# Patient Record
Sex: MALE | Race: BLACK OR AFRICAN AMERICAN | Employment: FULL TIME | URBAN - METROPOLITAN AREA
[De-identification: names, ages, dates, MRNs, and addresses within clinical notes are randomized per-mention and may not be internally consistent; named-entity substitution may affect disease eponyms.]

---

## 2017-10-23 ENCOUNTER — HOSPITAL ENCOUNTER (EMERGENCY)
Age: 27
Discharge: HOME | End: 2017-10-23
Attending: EMERGENCY MEDICINE
Payer: COMMERCIAL

## 2017-10-23 VITALS
HEART RATE: 50 BPM | BODY MASS INDEX: 30.1 KG/M2 | OXYGEN SATURATION: 98 % | TEMPERATURE: 97.7 F | DIASTOLIC BLOOD PRESSURE: 58 MMHG | WEIGHT: 215 LBS | HEIGHT: 71 IN | SYSTOLIC BLOOD PRESSURE: 123 MMHG | RESPIRATION RATE: 16 BRPM

## 2017-10-23 DIAGNOSIS — A08.4 VIRAL GASTROENTERITIS: Primary | ICD-10-CM

## 2017-10-23 LAB
ABSOLUTE EOS #: 0.1 K/UL (ref 0–0.4)
ABSOLUTE IMMATURE GRANULOCYTE: NORMAL K/UL (ref 0–0.3)
ABSOLUTE LYMPH #: 1.4 K/UL (ref 1–4.8)
ABSOLUTE MONO #: 0.4 K/UL (ref 0.2–0.8)
ALBUMIN SERPL-MCNC: 4.4 G/DL (ref 3.5–5.2)
ALBUMIN/GLOBULIN RATIO: ABNORMAL (ref 1–2.5)
ALP BLD-CCNC: 80 U/L (ref 40–129)
ALT SERPL-CCNC: 13 U/L (ref 5–41)
ANION GAP SERPL CALCULATED.3IONS-SCNC: 12 MMOL/L (ref 9–17)
AST SERPL-CCNC: 17 U/L
BASOPHILS # BLD: 1 %
BASOPHILS ABSOLUTE: 0.1 K/UL (ref 0–0.2)
BILIRUB SERPL-MCNC: 0.31 MG/DL (ref 0.3–1.2)
BUN BLDV-MCNC: 14 MG/DL (ref 6–20)
BUN/CREAT BLD: 15 (ref 9–20)
CALCIUM SERPL-MCNC: 9.3 MG/DL (ref 8.6–10.4)
CHLORIDE BLD-SCNC: 107 MMOL/L (ref 98–107)
CO2: 27 MMOL/L (ref 20–31)
CREAT SERPL-MCNC: 0.96 MG/DL (ref 0.7–1.2)
DIFFERENTIAL TYPE: NORMAL
EOSINOPHILS RELATIVE PERCENT: 1 %
GFR AFRICAN AMERICAN: >60 ML/MIN
GFR NON-AFRICAN AMERICAN: >60 ML/MIN
GFR SERPL CREATININE-BSD FRML MDRD: ABNORMAL ML/MIN/{1.73_M2}
GFR SERPL CREATININE-BSD FRML MDRD: ABNORMAL ML/MIN/{1.73_M2}
GLUCOSE BLD-MCNC: 99 MG/DL (ref 70–99)
HCT VFR BLD CALC: 41.8 % (ref 41–53)
HEMOGLOBIN: 14.1 G/DL (ref 13.5–17.5)
IMMATURE GRANULOCYTES: NORMAL %
LIPASE: 13 U/L (ref 13–60)
LYMPHOCYTES # BLD: 15 %
MCH RBC QN AUTO: 30.5 PG (ref 26–34)
MCHC RBC AUTO-ENTMCNC: 33.8 G/DL (ref 31–37)
MCV RBC AUTO: 90.1 FL (ref 80–100)
MONOCYTES # BLD: 4 %
PDW BLD-RTO: 14.3 % (ref 11.5–14.5)
PLATELET # BLD: 173 K/UL (ref 130–400)
PLATELET ESTIMATE: NORMAL
PMV BLD AUTO: 9.4 FL (ref 6–12)
POTASSIUM SERPL-SCNC: 4 MMOL/L (ref 3.7–5.3)
RBC # BLD: 4.64 M/UL (ref 4.5–5.9)
RBC # BLD: NORMAL 10*6/UL
SEG NEUTROPHILS: 79 %
SEGMENTED NEUTROPHILS ABSOLUTE COUNT: 7.5 K/UL (ref 1.8–7.7)
SODIUM BLD-SCNC: 146 MMOL/L (ref 135–144)
TOTAL PROTEIN: 7.1 G/DL (ref 6.4–8.3)
WBC # BLD: 9.5 K/UL (ref 3.5–11)
WBC # BLD: NORMAL 10*3/UL

## 2017-10-23 PROCEDURE — 6360000002 HC RX W HCPCS: Performed by: NURSE PRACTITIONER

## 2017-10-23 PROCEDURE — 83690 ASSAY OF LIPASE: CPT

## 2017-10-23 PROCEDURE — 96374 THER/PROPH/DIAG INJ IV PUSH: CPT

## 2017-10-23 PROCEDURE — 99284 EMERGENCY DEPT VISIT MOD MDM: CPT

## 2017-10-23 PROCEDURE — 2580000003 HC RX 258: Performed by: NURSE PRACTITIONER

## 2017-10-23 PROCEDURE — 80053 COMPREHEN METABOLIC PANEL: CPT

## 2017-10-23 PROCEDURE — 85025 COMPLETE CBC W/AUTO DIFF WBC: CPT

## 2017-10-23 PROCEDURE — 96361 HYDRATE IV INFUSION ADD-ON: CPT

## 2017-10-23 PROCEDURE — 96375 TX/PRO/DX INJ NEW DRUG ADDON: CPT

## 2017-10-23 RX ORDER — 0.9 % SODIUM CHLORIDE 0.9 %
1000 INTRAVENOUS SOLUTION INTRAVENOUS ONCE
Status: COMPLETED | OUTPATIENT
Start: 2017-10-23 | End: 2017-10-23

## 2017-10-23 RX ORDER — FENTANYL CITRATE 50 UG/ML
50 INJECTION, SOLUTION INTRAMUSCULAR; INTRAVENOUS ONCE
Status: COMPLETED | OUTPATIENT
Start: 2017-10-23 | End: 2017-10-23

## 2017-10-23 RX ORDER — ONDANSETRON 2 MG/ML
4 INJECTION INTRAMUSCULAR; INTRAVENOUS ONCE
Status: COMPLETED | OUTPATIENT
Start: 2017-10-23 | End: 2017-10-23

## 2017-10-23 RX ORDER — ONDANSETRON 4 MG/1
4 TABLET, ORALLY DISINTEGRATING ORAL EVERY 6 HOURS PRN
Qty: 12 TABLET | Refills: 0 | Status: SHIPPED | OUTPATIENT
Start: 2017-10-23 | End: 2018-09-19 | Stop reason: ALTCHOICE

## 2017-10-23 RX ADMIN — SODIUM CHLORIDE 1000 ML: 9 INJECTION, SOLUTION INTRAVENOUS at 18:57

## 2017-10-23 RX ADMIN — FENTANYL CITRATE 50 MCG: 50 INJECTION INTRAMUSCULAR; INTRAVENOUS at 19:03

## 2017-10-23 RX ADMIN — ONDANSETRON 4 MG: 2 INJECTION INTRAMUSCULAR; INTRAVENOUS at 19:01

## 2017-10-23 ASSESSMENT — ENCOUNTER SYMPTOMS
VOMITING: 1
NAUSEA: 1
ABDOMINAL PAIN: 0
DIARRHEA: 1

## 2017-10-23 ASSESSMENT — PAIN SCALES - GENERAL
PAINLEVEL_OUTOF10: 10
PAINLEVEL_OUTOF10: 5

## 2017-10-23 ASSESSMENT — PAIN DESCRIPTION - LOCATION: LOCATION: HEAD

## 2017-10-23 ASSESSMENT — PAIN DESCRIPTION - DESCRIPTORS: DESCRIPTORS: ACHING;DISCOMFORT

## 2017-10-23 ASSESSMENT — PAIN DESCRIPTION - PROGRESSION: CLINICAL_PROGRESSION: GRADUALLY WORSENING

## 2017-10-23 NOTE — ED NOTES
Pt ambulatory to room 14 with c/o frontal lobe HA with N/V and intermittent SOB, onset approximately 1330 today. Pt reports hx of Migraine H/A, states \"I used to take Topamax when I was a kid, but I don't take it anymore\". Pt reports slight blurred vision, but denies any diplopia. Pt denies any CP, dizziness, abd pain, diarrhea, urinary symptoms, or fevers.       Jorge Robertson RN  10/23/17 1910

## 2017-10-23 NOTE — ED PROVIDER NOTES
The patient was seen and examined by me in conjunction with the mid-level provider. I agree with his/her assessment and treatment plan. My clinical impression is that the patient has viral gastroenteritis.      Federico Galan MD  10/23/17 3074
nerve deficit or sensory deficit. Skin: Skin is warm, dry and intact. Psychiatric: He has a normal mood and affect. His speech is normal and behavior is normal. Judgment and thought content normal. Cognition and memory are normal.         DIAGNOSTIC RESULTS     EKG: All EKG's are interpreted by the Emergency Department Physician who either signs or Co-signs this chart in the absence of a cardiologist.        RADIOLOGY:   Non-plain film images such as CT, Ultrasound and MRI are read by the radiologist. Plain radiographic images are visualized and preliminarily interpreted by the emergency physician with the below findings:        Interpretation per the Radiologist below, if available at the time of this note:    No orders to display         ED BEDSIDE ULTRASOUND:   Performed by ED Physician - none    LABS:  Labs Reviewed   COMPREHENSIVE METABOLIC PANEL - Abnormal; Notable for the following:        Result Value    Sodium 146 (*)     All other components within normal limits   CBC WITH AUTO DIFFERENTIAL   LIPASE       All other labs were within normal range or not returned as of this dictation. EMERGENCY DEPARTMENT COURSE and DIFFERENTIAL DIAGNOSIS/MDM:   Patient was evaluated in conjunction with Dr. Jacy Granados. Labs reviewed. Abdomen is soft and nontender. Bowel sounds active. No neurological deficits. Patient was given IV fluids, fentanyl and Zofran in the ED. Patient states his symptoms have improved. He'll be discharged home with Zofran for symptom management. Patient likely has a gastroenteritis. Return to ED if symptoms worsen. Vitals:    Vitals:    10/23/17 1840   BP: (!) 123/58   Pulse: 50   Resp: 16   Temp: 97.7 °F (36.5 °C)   TempSrc: Oral   SpO2: 98%   Weight: 215 lb (97.5 kg)   Height: 5' 11\" (1.803 m)         CONSULTS:  None    PROCEDURES:  Procedures    FINAL IMPRESSION      1.  Viral gastroenteritis          DISPOSITION/PLAN   DISPOSITION Decision to Discharge    PATIENT REFERRED TO:

## 2017-12-29 ENCOUNTER — HOSPITAL ENCOUNTER (EMERGENCY)
Age: 27
Discharge: HOME | End: 2017-12-30
Attending: EMERGENCY MEDICINE
Payer: COMMERCIAL

## 2017-12-29 DIAGNOSIS — R21 PENILE RASH: ICD-10-CM

## 2017-12-29 DIAGNOSIS — A60.00 GENITAL HERPES SIMPLEX, UNSPECIFIED SITE: Primary | ICD-10-CM

## 2017-12-29 PROCEDURE — 99283 EMERGENCY DEPT VISIT LOW MDM: CPT

## 2017-12-29 NOTE — LETTER
[facility] ED  [address] [address]  Phone: [phone]        January 18, 2018      Dear Amaury Soni    At the time of your visit to [address] [place] [address] Emergency Room on , A culture was obtained. It was positive for : gonorrhea and chlamydia. You were treated at [address] sex partner needs to be treated also. You received treatment at the time of your visit. Follow up with your primary care physician is important for your health maintenance. Please make an appointment as soon as possible.        Sincerely,         [address] [place] [address] ER

## 2017-12-30 ENCOUNTER — HOSPITAL ENCOUNTER (EMERGENCY)
Age: 27
Discharge: HOME | End: 2017-12-30
Payer: COMMERCIAL

## 2017-12-30 VITALS
OXYGEN SATURATION: 98 % | RESPIRATION RATE: 15 BRPM | SYSTOLIC BLOOD PRESSURE: 120 MMHG | HEIGHT: 71 IN | WEIGHT: 210 LBS | DIASTOLIC BLOOD PRESSURE: 72 MMHG | HEART RATE: 70 BPM | BODY MASS INDEX: 29.4 KG/M2 | TEMPERATURE: 97.9 F

## 2017-12-30 VITALS
SYSTOLIC BLOOD PRESSURE: 157 MMHG | OXYGEN SATURATION: 100 % | HEIGHT: 71 IN | TEMPERATURE: 98.5 F | RESPIRATION RATE: 16 BRPM | WEIGHT: 210 LBS | BODY MASS INDEX: 29.4 KG/M2 | HEART RATE: 56 BPM | DIASTOLIC BLOOD PRESSURE: 84 MMHG

## 2017-12-30 DIAGNOSIS — N48.5 PENILE ULCER: ICD-10-CM

## 2017-12-30 DIAGNOSIS — N34.2 URETHRITIS: Primary | ICD-10-CM

## 2017-12-30 LAB
DIRECT EXAM: NORMAL
DIRECT EXAM: NORMAL
Lab: NORMAL
SPECIMEN DESCRIPTION: NORMAL
STATUS: NORMAL
T. PALLIDUM, IGG: NONREACTIVE

## 2017-12-30 PROCEDURE — 99283 EMERGENCY DEPT VISIT LOW MDM: CPT

## 2017-12-30 PROCEDURE — 87015 SPECIMEN INFECT AGNT CONCNTJ: CPT

## 2017-12-30 PROCEDURE — 86780 TREPONEMA PALLIDUM: CPT

## 2017-12-30 PROCEDURE — 87255 GENET VIRUS ISOLATE HSV: CPT

## 2017-12-30 PROCEDURE — 6360000002 HC RX W HCPCS: Performed by: NURSE PRACTITIONER

## 2017-12-30 PROCEDURE — 87210 SMEAR WET MOUNT SALINE/INK: CPT

## 2017-12-30 PROCEDURE — 87491 CHLMYD TRACH DNA AMP PROBE: CPT

## 2017-12-30 PROCEDURE — 87591 N.GONORRHOEAE DNA AMP PROB: CPT

## 2017-12-30 PROCEDURE — 96372 THER/PROPH/DIAG INJ SC/IM: CPT

## 2017-12-30 PROCEDURE — 6370000000 HC RX 637 (ALT 250 FOR IP): Performed by: NURSE PRACTITIONER

## 2017-12-30 RX ORDER — CEFTRIAXONE SODIUM 250 MG/1
250 INJECTION, POWDER, FOR SOLUTION INTRAMUSCULAR; INTRAVENOUS ONCE
Status: COMPLETED | OUTPATIENT
Start: 2017-12-30 | End: 2017-12-30

## 2017-12-30 RX ORDER — IPRATROPIUM BROMIDE AND ALBUTEROL SULFATE 2.5; .5 MG/3ML; MG/3ML
1 SOLUTION RESPIRATORY (INHALATION)
Status: DISCONTINUED | OUTPATIENT
Start: 2017-12-30 | End: 2017-12-30 | Stop reason: HOSPADM

## 2017-12-30 RX ORDER — AZITHROMYCIN 250 MG/1
1000 TABLET, FILM COATED ORAL ONCE
Status: COMPLETED | OUTPATIENT
Start: 2017-12-30 | End: 2017-12-30

## 2017-12-30 RX ORDER — ACYCLOVIR 800 MG/1
800 TABLET ORAL
Qty: 50 TABLET | Refills: 0 | Status: SHIPPED | OUTPATIENT
Start: 2017-12-30 | End: 2017-12-30

## 2017-12-30 RX ORDER — ACYCLOVIR 800 MG/1
200 TABLET ORAL
Qty: 25 TABLET | Refills: 0 | Status: SHIPPED | OUTPATIENT
Start: 2017-12-30 | End: 2018-01-09

## 2017-12-30 RX ADMIN — CEFTRIAXONE SODIUM 250 MG: 250 INJECTION, POWDER, FOR SOLUTION INTRAMUSCULAR; INTRAVENOUS at 14:56

## 2017-12-30 RX ADMIN — AZITHROMYCIN 1000 MG: 250 TABLET, FILM COATED ORAL at 14:55

## 2017-12-30 ASSESSMENT — PAIN DESCRIPTION - DURATION: DURATION_2: CONTINUOUS

## 2017-12-30 ASSESSMENT — PAIN DESCRIPTION - ORIENTATION
ORIENTATION_2: POSTERIOR
ORIENTATION: DISTAL

## 2017-12-30 ASSESSMENT — PAIN DESCRIPTION - PAIN TYPE
TYPE_2: ACUTE PAIN
TYPE: ACUTE PAIN

## 2017-12-30 ASSESSMENT — PAIN DESCRIPTION - FREQUENCY: FREQUENCY: CONTINUOUS

## 2017-12-30 ASSESSMENT — PAIN DESCRIPTION - LOCATION
LOCATION: PENIS
LOCATION_2: HEAD

## 2017-12-30 ASSESSMENT — PAIN DESCRIPTION - DESCRIPTORS: DESCRIPTORS_2: SORE

## 2017-12-30 ASSESSMENT — PAIN DESCRIPTION - INTENSITY: RATING_2: 10

## 2017-12-30 ASSESSMENT — PAIN SCALES - GENERAL: PAINLEVEL_OUTOF10: 10

## 2017-12-30 NOTE — ED PROVIDER NOTES
EYES: Eyes are normal to inspection, Pupils equal, round and reactive to light. NECK: Normal ROM, No jugular venous distention, No meningeal signs, Cervical spine nontender. RESPIRATORY CHEST: No respiratory distress. Symmetric chest rise observed. Lungs are clear to auscultate without wheezes, rhonchi or rhales     ABDOMEN: Abdomen is nontender, No distension. :  No cva tenderness   UPPER EXTREMITY: Inspection normal, No cyanosis. NEURO: GCS is 15, Speech normal, Memory normal.   SKIN: Skin is warm, Skin is dry. PSYCHIATRIC: Oriented X 3, Normal affect  Testicular exam chaperoned. Three lesions. One on the left aspect near the head of the penis ulcerated slightly tender. The other two on the right side ulcerated. Tender. Without any crusting or yellowness. Slight lad bilaterally. DIFFERENTIAL  DIAGNOSIS     PLAN (LABS / IMAGING / EKG):  Orders Placed This Encounter   Procedures    Wet prep, genital    C.trachomatis N.gonorrhoeae DNA, Urine    Herpes Simplex Virus Culture    T. PALLIDUM AB    Inpatient consult to Social Work       MEDICATIONS ORDERED:  Orders Placed This Encounter   Medications    DISCONTD: ipratropium-albuterol (DUONEB) nebulizer solution 1 ampule    DISCONTD: acyclovir (ZOVIRAX) 800 MG tablet     Sig: Take 1 tablet by mouth 5 times daily for 10 days     Dispense:  50 tablet     Refill:  0    acyclovir (ZOVIRAX) 800 MG tablet     Sig: Take 0.5 tablets by mouth 5 times daily for 10 days     Dispense:  25 tablet     Refill:  0       DDX: ureteritis via std (gonnococcal, chlamydia) others trichomonas, herpes, other causes of urethritis include chemical/irritants, allergic, physical damage, adenovirus, other infections like mycoplasm    DIAGNOSTIC RESULTS / EMERGENCY DEPARTMENT COURSE / MDM     LABS:  Results for orders placed or performed during the hospital encounter of 12/29/17   Wet prep, genital   Result Value Ref Range    Specimen Description . URINE     Special Requests

## 2017-12-30 NOTE — ED PROVIDER NOTES
[address] [place] [address] ED  eMERGENCY dEPARTMENT eNCOUnter      Pt Name: Radha Schaffer  MRN: [id number]  [place] 1990  Date of evaluation: 12/30/2017  Provider: Linda Umana NP    [address] [place] [address]       Chief Complaint   Patient presents with    Dysuria    Penile Discharge    Other     open sore R side of penis         HISTORY OF PRESENT ILLNESS  (Location/Symptom, Timing/Onset, Context/Setting, Quality, Duration, Modifying Factors, Severity.)   Radha Schaffer is a 24 y.o. male who presents to the emergency department Via private auto with a little sores and penile drainage. He noticed sores to the penis within the past 4-5 days. He was seen at another emergency room last evening and was told that the sores are likely herpes. They did blood work and took a urine specimen but he states the lesions were not swabbed. He would like a second opinion. In addition to the rash that started earlier this week he developed white penile drainage that started this morning. He does admit to unprotected sex. He denies abdominal pain, back pain or fevers. Nursing Notes were reviewed. ALLERGIES     Pcn [penicillins];  Seasonal; and Vicodin [hydrocodone-acetaminophen]    CURRENT MEDICATIONS       Discharge Medication List as of 12/30/2017  3:14 PM      CONTINUE these medications which have NOT CHANGED    Details   acyclovir (ZOVIRAX) 800 MG tablet Take 0.5 tablets by mouth 5 times daily for 10 days, Disp-25 tablet, R-0Print      QUEtiapine Fumarate (SEROQUEL PO) Take 300 mg by mouthHistorical Med      Divalproex Sodium (DEPAKOTE PO) Take by mouthHistorical Med      ondansetron (ZOFRAN ODT) 4 MG disintegrating tablet Take 1 tablet by mouth every 6 hours as needed for Nausea or Vomiting, Disp-12 tablet, R-0Print      ibuprofen (ADVIL;MOTRIN) 800 MG tablet Take 1 tablet by mouth every 8 hours as needed for Pain, Disp-30 tablet, R-0      albuterol (PROVENTIL HFA) 108 (90 BASE) MCG/ACT inhaler Inhale 1-2 puffs into the lungs

## 2017-12-30 NOTE — ED NOTES
Pt reported to Ed with c/o of generalized body aches and slight joint pains, stating \"I am feeling achy. \" Pt also c/o of rash/sores on penis, that are painful, and \"feels like a needle,\" upon touch. Pt denies unprotected sex with anyone, and states \"besides my wife, but it has been a few weeks since the last time. \"     Pt also requests a breathing treatment and states that it \"feels like fluid\" is in his chest.       Danielito Norton Sindy  12/30/17 0012       Danielito Norton Sindy  12/30/17 0022

## 2018-01-01 LAB
CULTURE: ABNORMAL
CULTURE: NORMAL
CULTURE: NORMAL
Lab: ABNORMAL
Lab: NORMAL
SPECIMEN DESCRIPTION: ABNORMAL
SPECIMEN DESCRIPTION: ABNORMAL
SPECIMEN DESCRIPTION: NORMAL
STATUS: ABNORMAL
STATUS: NORMAL

## 2018-01-02 LAB
C. TRACHOMATIS DNA ,URINE: ABNORMAL
N. GONORRHOEAE DNA, URINE: ABNORMAL

## 2018-02-05 ENCOUNTER — APPOINTMENT (OUTPATIENT)
Dept: GENERAL RADIOLOGY | Age: 28
End: 2018-02-05
Payer: COMMERCIAL

## 2018-02-05 ENCOUNTER — APPOINTMENT (OUTPATIENT)
Dept: CT IMAGING | Age: 28
End: 2018-02-05
Payer: COMMERCIAL

## 2018-02-05 ENCOUNTER — HOSPITAL ENCOUNTER (EMERGENCY)
Age: 28
Discharge: HOME | End: 2018-02-05
Attending: EMERGENCY MEDICINE
Payer: COMMERCIAL

## 2018-02-05 VITALS
DIASTOLIC BLOOD PRESSURE: 50 MMHG | OXYGEN SATURATION: 100 % | TEMPERATURE: 97.8 F | HEIGHT: 71 IN | BODY MASS INDEX: 30.8 KG/M2 | SYSTOLIC BLOOD PRESSURE: 103 MMHG | RESPIRATION RATE: 14 BRPM | WEIGHT: 220 LBS | HEART RATE: 55 BPM

## 2018-02-05 DIAGNOSIS — J01.90 ACUTE SINUSITIS, RECURRENCE NOT SPECIFIED, UNSPECIFIED LOCATION: ICD-10-CM

## 2018-02-05 DIAGNOSIS — S20.211A CONTUSION OF RIGHT CHEST WALL, INITIAL ENCOUNTER: ICD-10-CM

## 2018-02-05 DIAGNOSIS — F10.929 ACUTE ALCOHOLIC INTOXICATION WITH COMPLICATION (HCC): ICD-10-CM
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
## 2018-02-05 DIAGNOSIS — S09.90XA CLOSED HEAD INJURY, INITIAL ENCOUNTER: ICD-10-CM

## 2018-02-05 DIAGNOSIS — S80.11XA CONTUSION OF MULTIPLE SITES OF RIGHT LOWER EXTREMITY, INITIAL ENCOUNTER: ICD-10-CM

## 2018-02-05 LAB
ABSOLUTE EOS #: 0.2 K/UL (ref 0–0.4)
ABSOLUTE IMMATURE GRANULOCYTE: NORMAL K/UL (ref 0–0.3)
ABSOLUTE LYMPH #: 2.5 K/UL (ref 1–4.8)
ABSOLUTE MONO #: 0.6 K/UL (ref 0.1–1.3)
ALBUMIN SERPL-MCNC: 4.1 G/DL (ref 3.5–5.2)
ALBUMIN/GLOBULIN RATIO: ABNORMAL (ref 1–2.5)
ALP BLD-CCNC: 85 U/L (ref 40–129)
ALT SERPL-CCNC: 20 U/L (ref 5–41)
ANION GAP SERPL CALCULATED.3IONS-SCNC: 18 MMOL/L (ref 9–17)
AST SERPL-CCNC: 26 U/L
BASOPHILS # BLD: 1 % (ref 0–2)
BASOPHILS ABSOLUTE: 0 K/UL (ref 0–0.2)
BILIRUB SERPL-MCNC: <0.15 MG/DL (ref 0.3–1.2)
BILIRUBIN DIRECT: <0.08 MG/DL
BILIRUBIN, INDIRECT: ABNORMAL MG/DL (ref 0–1)
BUN BLDV-MCNC: 14 MG/DL (ref 6–20)
BUN/CREAT BLD: ABNORMAL (ref 9–20)
CALCIUM SERPL-MCNC: 8.9 MG/DL (ref 8.6–10.4)
CHLORIDE BLD-SCNC: 102 MMOL/L (ref 98–107)
CO2: 20 MMOL/L (ref 20–31)
CREAT SERPL-MCNC: 0.78 MG/DL (ref 0.7–1.2)
DIFFERENTIAL TYPE: NORMAL
EOSINOPHILS RELATIVE PERCENT: 2 % (ref 0–4)
ETHANOL PERCENT: 0.13 %
ETHANOL: 134 MG/DL
GFR AFRICAN AMERICAN: >60 ML/MIN
GFR NON-AFRICAN AMERICAN: >60 ML/MIN
GFR SERPL CREATININE-BSD FRML MDRD: ABNORMAL ML/MIN/{1.73_M2}
GFR SERPL CREATININE-BSD FRML MDRD: ABNORMAL ML/MIN/{1.73_M2}
GLOBULIN: ABNORMAL G/DL (ref 1.5–3.8)
GLUCOSE BLD-MCNC: 85 MG/DL (ref 70–99)
HCT VFR BLD CALC: 44.1 % (ref 41–53)
HEMOGLOBIN: 15.2 G/DL (ref 13.5–17.5)
IMMATURE GRANULOCYTES: NORMAL %
INR BLD: 1
LACTIC ACID: 2.5 MMOL/L (ref 0.5–2.2)
LYMPHOCYTES # BLD: 30 % (ref 24–44)
MCH RBC QN AUTO: 31.4 PG (ref 26–34)
MCHC RBC AUTO-ENTMCNC: 34.5 G/DL (ref 31–37)
MCV RBC AUTO: 90.8 FL (ref 80–100)
MONOCYTES # BLD: 7 % (ref 1–7)
NRBC AUTOMATED: NORMAL PER 100 WBC
PDW BLD-RTO: 13.5 % (ref 11.5–14.9)
PLATELET # BLD: 161 K/UL (ref 150–450)
PLATELET ESTIMATE: NORMAL
PMV BLD AUTO: 10.2 FL (ref 6–12)
POTASSIUM SERPL-SCNC: 3.3 MMOL/L (ref 3.7–5.3)
PROTHROMBIN TIME: 10.1 SEC (ref 9.7–12)
RBC # BLD: 4.85 M/UL (ref 4.5–5.9)
RBC # BLD: NORMAL 10*6/UL
SEG NEUTROPHILS: 60 % (ref 36–66)
SEGMENTED NEUTROPHILS ABSOLUTE COUNT: 4.9 K/UL (ref 1.3–9.1)
SODIUM BLD-SCNC: 140 MMOL/L (ref 135–144)
TOTAL PROTEIN: 6.9 G/DL (ref 6.4–8.3)
TROPONIN INTERP: NORMAL
TROPONIN T: <0.03 NG/ML
WBC # BLD: 8.2 K/UL (ref 3.5–11)
WBC # BLD: NORMAL 10*3/UL

## 2018-02-05 PROCEDURE — 70450 CT HEAD/BRAIN W/O DYE: CPT

## 2018-02-05 PROCEDURE — 85610 PROTHROMBIN TIME: CPT

## 2018-02-05 PROCEDURE — 99284 EMERGENCY DEPT VISIT MOD MDM: CPT

## 2018-02-05 PROCEDURE — 74177 CT ABD & PELVIS W/CONTRAST: CPT

## 2018-02-05 PROCEDURE — 83605 ASSAY OF LACTIC ACID: CPT

## 2018-02-05 PROCEDURE — 71260 CT THORAX DX C+: CPT

## 2018-02-05 PROCEDURE — 73590 X-RAY EXAM OF LOWER LEG: CPT

## 2018-02-05 PROCEDURE — 80076 HEPATIC FUNCTION PANEL: CPT

## 2018-02-05 PROCEDURE — 2580000003 HC RX 258: Performed by: EMERGENCY MEDICINE

## 2018-02-05 PROCEDURE — 73552 X-RAY EXAM OF FEMUR 2/>: CPT

## 2018-02-05 PROCEDURE — 84484 ASSAY OF TROPONIN QUANT: CPT

## 2018-02-05 PROCEDURE — 85025 COMPLETE CBC W/AUTO DIFF WBC: CPT

## 2018-02-05 PROCEDURE — 72125 CT NECK SPINE W/O DYE: CPT

## 2018-02-05 PROCEDURE — 6360000004 HC RX CONTRAST MEDICATION: Performed by: EMERGENCY MEDICINE

## 2018-02-05 PROCEDURE — 80048 BASIC METABOLIC PNL TOTAL CA: CPT

## 2018-02-05 PROCEDURE — G0480 DRUG TEST DEF 1-7 CLASSES: HCPCS

## 2018-02-05 PROCEDURE — 36415 COLL VENOUS BLD VENIPUNCTURE: CPT

## 2018-02-05 RX ORDER — CYCLOBENZAPRINE HCL 10 MG
10 TABLET ORAL 3 TIMES DAILY PRN
Qty: 15 TABLET | Refills: 0 | Status: SHIPPED | OUTPATIENT
Start: 2018-02-05 | End: 2018-02-15

## 2018-02-05 RX ORDER — 0.9 % SODIUM CHLORIDE 0.9 %
1000 INTRAVENOUS SOLUTION INTRAVENOUS ONCE
Status: COMPLETED | OUTPATIENT
Start: 2018-02-05 | End: 2018-02-05

## 2018-02-05 RX ORDER — SODIUM CHLORIDE 0.9 % (FLUSH) 0.9 %
10 SYRINGE (ML) INJECTION PRN
Status: DISCONTINUED | OUTPATIENT
Start: 2018-02-05 | End: 2018-02-05 | Stop reason: HOSPADM

## 2018-02-05 RX ORDER — IBUPROFEN 600 MG/1
600 TABLET ORAL EVERY 6 HOURS PRN
Qty: 30 TABLET | Refills: 0 | Status: SHIPPED | OUTPATIENT
Start: 2018-02-05 | End: 2018-09-19 | Stop reason: ALTCHOICE

## 2018-02-05 RX ORDER — 0.9 % SODIUM CHLORIDE 0.9 %
100 INTRAVENOUS SOLUTION INTRAVENOUS ONCE
Status: COMPLETED | OUTPATIENT
Start: 2018-02-05 | End: 2018-02-05

## 2018-02-05 RX ADMIN — SODIUM CHLORIDE 1000 ML: 9 INJECTION, SOLUTION INTRAVENOUS at 04:45

## 2018-02-05 RX ADMIN — IOPAMIDOL 100 ML: 755 INJECTION, SOLUTION INTRAVENOUS at 05:08

## 2018-02-05 RX ADMIN — SODIUM CHLORIDE 100 ML: 9 INJECTION, SOLUTION INTRAVENOUS at 05:08

## 2018-02-05 RX ADMIN — Medication 10 ML: at 05:08

## 2018-02-05 ASSESSMENT — PAIN DESCRIPTION - PAIN TYPE: TYPE: ACUTE PAIN

## 2018-02-05 ASSESSMENT — ENCOUNTER SYMPTOMS
NAUSEA: 0
ABDOMINAL PAIN: 1
BACK PAIN: 1
VOMITING: 0
SHORTNESS OF BREATH: 0
COUGH: 0

## 2018-02-05 ASSESSMENT — PAIN SCALES - GENERAL: PAINLEVEL_OUTOF10: 10

## 2018-02-05 ASSESSMENT — PAIN DESCRIPTION - ORIENTATION: ORIENTATION: RIGHT

## 2018-02-05 ASSESSMENT — PAIN DESCRIPTION - LOCATION: LOCATION: GENERALIZED

## 2018-02-05 ASSESSMENT — PAIN DESCRIPTION - DESCRIPTORS: DESCRIPTORS: ACHING

## 2018-02-05 ASSESSMENT — PAIN DESCRIPTION - FREQUENCY: FREQUENCY: CONTINUOUS
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
## 2018-09-19 ENCOUNTER — OFFICE VISIT (OUTPATIENT)
Dept: FAMILY MEDICINE CLINIC | Age: 28
End: 2018-09-19
Payer: COMMERCIAL

## 2018-09-19 VITALS
RESPIRATION RATE: 16 BRPM | WEIGHT: 201 LBS | BODY MASS INDEX: 28.03 KG/M2 | OXYGEN SATURATION: 98 % | DIASTOLIC BLOOD PRESSURE: 78 MMHG | SYSTOLIC BLOOD PRESSURE: 136 MMHG | HEART RATE: 60 BPM

## 2018-09-19 DIAGNOSIS — Z00.00 VISIT FOR WELL MAN HEALTH CHECK: Primary | ICD-10-CM

## 2018-09-19 DIAGNOSIS — K59.00 CONSTIPATION, UNSPECIFIED CONSTIPATION TYPE: ICD-10-CM

## 2018-09-19 DIAGNOSIS — K21.9 GASTROESOPHAGEAL REFLUX DISEASE WITHOUT ESOPHAGITIS: ICD-10-CM

## 2018-09-19 DIAGNOSIS — F32.A ANXIETY AND DEPRESSION: ICD-10-CM

## 2018-09-19 DIAGNOSIS — Z76.89 ESTABLISHING CARE WITH NEW DOCTOR, ENCOUNTER FOR: ICD-10-CM

## 2018-09-19 DIAGNOSIS — J45.20 MILD INTERMITTENT ASTHMA WITHOUT COMPLICATION: ICD-10-CM

## 2018-09-19 DIAGNOSIS — A60.00 GENITAL HERPES SIMPLEX, UNSPECIFIED SITE: ICD-10-CM

## 2018-09-19 DIAGNOSIS — F41.9 ANXIETY AND DEPRESSION: ICD-10-CM

## 2018-09-19 DIAGNOSIS — F17.200 SMOKES: ICD-10-CM

## 2018-09-19 PROCEDURE — 99385 PREV VISIT NEW AGE 18-39: CPT | Performed by: FAMILY MEDICINE

## 2018-09-19 PROCEDURE — 99406 BEHAV CHNG SMOKING 3-10 MIN: CPT | Performed by: FAMILY MEDICINE

## 2018-09-19 PROCEDURE — 99203 OFFICE O/P NEW LOW 30 MIN: CPT | Performed by: FAMILY MEDICINE

## 2018-09-19 RX ORDER — ALBUTEROL SULFATE 90 UG/1
1-2 AEROSOL, METERED RESPIRATORY (INHALATION) EVERY 4 HOURS PRN
Qty: 1 INHALER | Refills: 0 | Status: SHIPPED | OUTPATIENT
Start: 2018-09-19

## 2018-09-19 RX ORDER — OMEPRAZOLE 40 MG/1
40 CAPSULE, DELAYED RELEASE ORAL DAILY
Qty: 30 CAPSULE | Refills: 3 | Status: SHIPPED | OUTPATIENT
Start: 2018-09-19

## 2018-09-19 RX ORDER — VARENICLINE TARTRATE 1 MG/1
1 TABLET, FILM COATED ORAL 2 TIMES DAILY
Qty: 60 TABLET | Refills: 1 | Status: SHIPPED | OUTPATIENT
Start: 2018-09-19 | End: 2018-11-07

## 2018-09-19 RX ORDER — POLYETHYLENE GLYCOL 3350 17 G/17G
17 POWDER, FOR SOLUTION ORAL DAILY
Qty: 510 G | Refills: 3 | Status: SHIPPED | OUTPATIENT
Start: 2018-09-19 | End: 2018-10-19

## 2018-09-19 RX ORDER — ONDANSETRON HYDROCHLORIDE 8 MG/1
8 TABLET, FILM COATED ORAL EVERY 8 HOURS PRN
Qty: 30 TABLET | Refills: 0 | Status: SHIPPED | OUTPATIENT
Start: 2018-09-19 | End: 2018-10-04 | Stop reason: SDUPTHER

## 2018-09-19 RX ORDER — VARENICLINE TARTRATE 25 MG
KIT ORAL
Qty: 1 EACH | Refills: 0 | Status: SHIPPED | OUTPATIENT
Start: 2018-09-19 | End: 2018-11-07

## 2018-09-19 ASSESSMENT — PATIENT HEALTH QUESTIONNAIRE - PHQ9
1. LITTLE INTEREST OR PLEASURE IN DOING THINGS: 0
SUM OF ALL RESPONSES TO PHQ QUESTIONS 1-9: 1
SUM OF ALL RESPONSES TO PHQ QUESTIONS 1-9: 1
2. FEELING DOWN, DEPRESSED OR HOPELESS: 1
SUM OF ALL RESPONSES TO PHQ9 QUESTIONS 1 & 2: 1

## 2018-09-19 ASSESSMENT — ENCOUNTER SYMPTOMS
EYE PAIN: 0
SHORTNESS OF BREATH: 0
NAUSEA: 1
DIARRHEA: 1
CONSTIPATION: 1

## 2018-09-19 NOTE — PROGRESS NOTES
Inhaler; Refill: 0    5. Anxiety and depression  Had a long discussion about importance of exercise and self-care. Recommended the pt meet with behavioral health for counseling. Rx given for antidepressant. Discussed side effects including suicidal thoughts, weight gain, sexual dysfunction, N/V, muscle cramps, headaches. Suicide contracted with the pt. Pt agrees to call 911 or seek medical care urgently if they feel suicidal. Follow up in 3-4 weeks for reassessment.  Olman Number E. Krista Foster, PhD - Nicholas Madison, PhD  - sertraline (ZOLOFT) 50 MG tablet; Take 1 tablet by mouth daily  Dispense: 30 tablet; Refill: 0    6. Gastroesophageal reflux disease without esophagitis  Pt seems to have signs and symptoms consistent with GERD. Started 8 week course of a PPI, pt will then switch to taking it as needed. Possible side effects of PPI therapy were discussed with the pt including calcium, magnesium, B12, iron malabsorption as well as kidney disease and C. difficile. Dietary and lifestyle advice was also given. Recommended avoidance of caffeine, nicotine, spicy food, greasy food, and pop. Pt was advised to place two small blocks under the head end of the bed which may help with night time reflux. Was advised not to eat at least 2-3 hrs before going to bed.   - ondansetron (ZOFRAN) 8 MG tablet; Take 1 tablet by mouth every 8 hours as needed for Nausea or Vomiting  Dispense: 30 tablet; Refill: 0  - omeprazole (PRILOSEC) 40 MG delayed release capsule; Take 1 capsule by mouth daily  Dispense: 30 capsule;  Refill: 3    Constipation  Recommend increasing water intake  Recommend high fiber diet  Recommend avoiding constipating foods - eg. cheese and bananas  Advised to use MiraLAX 1/2-1 capful 1-2 times per day and to mix with at least 8 ounces of water per 1 capful  Recommend daily exercise  Monitor for worsening symptoms    Flu shot declined  Follow-up in one month    Call or return to clinic prn if these symptoms worsen or fail to improve as anticipated. I have reviewed the instructions with the patient, answering all questions to their satisfaction.     Electronically signed by Jonathan Trujillo MD on 9/19/2018 at 4:18 PM

## 2018-09-24 LAB
ANTIBODY: NORMAL
ANTIGEN INTERPRETATION: NORMAL
AVERAGE GLUCOSE: 114
BASOPHILS ABSOLUTE: NORMAL /ΜL
BASOPHILS RELATIVE PERCENT: NORMAL %
BUN BLDV-MCNC: NORMAL MG/DL
CALCIUM SERPL-MCNC: NORMAL MG/DL
CHLORIDE BLD-SCNC: NORMAL MMOL/L
CHOLESTEROL, TOTAL: 146 MG/DL
CHOLESTEROL/HDL RATIO: 4.1
CO2: NORMAL MMOL/L
CREAT SERPL-MCNC: NORMAL MG/DL
EOSINOPHILS ABSOLUTE: NORMAL /ΜL
EOSINOPHILS RELATIVE PERCENT: NORMAL %
GFR CALCULATED: NORMAL
GLUCOSE BLD-MCNC: NORMAL MG/DL
HBA1C MFR BLD: 5.6 %
HCT VFR BLD CALC: NORMAL % (ref 41–53)
HDLC SERPL-MCNC: 36 MG/DL (ref 35–70)
HEMOGLOBIN: NORMAL G/DL (ref 13.5–17.5)
HIV AG/AB: NORMAL
LDL CHOLESTEROL CALCULATED: 60 MG/DL (ref 0–160)
LYMPHOCYTES ABSOLUTE: NORMAL /ΜL
LYMPHOCYTES RELATIVE PERCENT: NORMAL %
MCH RBC QN AUTO: NORMAL PG
MCHC RBC AUTO-ENTMCNC: NORMAL G/DL
MCV RBC AUTO: NORMAL FL
MONOCYTES ABSOLUTE: NORMAL /ΜL
MONOCYTES RELATIVE PERCENT: NORMAL %
NEUTROPHILS ABSOLUTE: NORMAL /ΜL
NEUTROPHILS RELATIVE PERCENT: NORMAL %
PDW BLD-RTO: NORMAL %
PLATELET # BLD: NORMAL K/ΜL
PMV BLD AUTO: NORMAL FL
POTASSIUM SERPL-SCNC: NORMAL MMOL/L
RBC # BLD: NORMAL 10^6/ΜL
SODIUM BLD-SCNC: NORMAL MMOL/L
TRIGL SERPL-MCNC: 252 MG/DL
TSH SERPL DL<=0.05 MIU/L-ACNC: NORMAL UIU/ML
VLDLC SERPL CALC-MCNC: 50 MG/DL
WBC # BLD: NORMAL 10^3/ML

## 2018-09-27 DIAGNOSIS — Z00.00 VISIT FOR WELL MAN HEALTH CHECK: ICD-10-CM

## 2018-10-04 DIAGNOSIS — K21.9 GASTROESOPHAGEAL REFLUX DISEASE WITHOUT ESOPHAGITIS: ICD-10-CM

## 2018-10-04 RX ORDER — ONDANSETRON HYDROCHLORIDE 8 MG/1
8 TABLET, FILM COATED ORAL EVERY 8 HOURS PRN
Qty: 21 TABLET | Refills: 0 | Status: SHIPPED | OUTPATIENT
Start: 2018-10-04 | End: 2018-11-07

## 2018-11-07 ENCOUNTER — OFFICE VISIT (OUTPATIENT)
Dept: FAMILY MEDICINE CLINIC | Age: 28
End: 2018-11-07
Payer: COMMERCIAL

## 2018-11-07 VITALS
DIASTOLIC BLOOD PRESSURE: 74 MMHG | OXYGEN SATURATION: 97 % | HEIGHT: 71 IN | SYSTOLIC BLOOD PRESSURE: 115 MMHG | RESPIRATION RATE: 16 BRPM | WEIGHT: 210.2 LBS | HEART RATE: 71 BPM | BODY MASS INDEX: 29.43 KG/M2

## 2018-11-07 DIAGNOSIS — R11.0 CHRONIC NAUSEA: ICD-10-CM

## 2018-11-07 DIAGNOSIS — R93.89 ABNORMAL CT OF THE CHEST: ICD-10-CM

## 2018-11-07 DIAGNOSIS — F41.9 ANXIETY AND DEPRESSION: ICD-10-CM

## 2018-11-07 DIAGNOSIS — F32.A ANXIETY AND DEPRESSION: ICD-10-CM

## 2018-11-07 DIAGNOSIS — F17.200 SMOKES: ICD-10-CM

## 2018-11-07 DIAGNOSIS — K59.00 CONSTIPATION, UNSPECIFIED CONSTIPATION TYPE: ICD-10-CM

## 2018-11-07 DIAGNOSIS — Z23 NEEDS FLU SHOT: ICD-10-CM

## 2018-11-07 DIAGNOSIS — K21.9 GASTROESOPHAGEAL REFLUX DISEASE WITHOUT ESOPHAGITIS: Primary | ICD-10-CM

## 2018-11-07 PROCEDURE — 99214 OFFICE O/P EST MOD 30 MIN: CPT | Performed by: FAMILY MEDICINE

## 2018-11-07 PROCEDURE — 90686 IIV4 VACC NO PRSV 0.5 ML IM: CPT | Performed by: FAMILY MEDICINE

## 2018-11-07 PROCEDURE — 90471 IMMUNIZATION ADMIN: CPT | Performed by: FAMILY MEDICINE

## 2018-11-07 RX ORDER — METOCLOPRAMIDE HYDROCHLORIDE 5 MG/5ML
5 SOLUTION ORAL 3 TIMES DAILY PRN
Qty: 240 ML | Refills: 3 | Status: SHIPPED | OUTPATIENT
Start: 2018-11-07

## 2018-11-07 ASSESSMENT — ENCOUNTER SYMPTOMS
CONSTIPATION: 0
NAUSEA: 1
ABDOMINAL PAIN: 0
SHORTNESS OF BREATH: 0
DIARRHEA: 0
EYE PAIN: 0
BLOOD IN STOOL: 0
VOMITING: 1

## 2018-11-07 NOTE — PROGRESS NOTES
Kain Jessica MD  [facility] PHYSICIANS  COMPREHENSIVE CARE  [facility] [address] [address]  Dept: [phone]    Eugene Garcia is a 34 y.o. male who presents today for hismedical conditions/complaints as noted below.   Eugene Garcia is here today c/o 1 Month Follow-Up; Nausea & Vomiting (comes and goes); and Flu Vaccine       HPI:     HPI    Follow-up for Zoloft for depression  Feels that the medications working well for him, he would like to continue the current dose, feels less depressed, no SI  No adverse effects on the Zoloft    Nausea, occurs daily, worse at night  Vomiting intermittent, no blood, occurs once a week, triggers include pop, stopped pop and vomiting continues, BMs normal, no blood, no diarrhea  CT abdo 2/2018 did not show any intra-abdominal pathology, there were opacities seen at the chest, no follow-up chest imaging since  Zofran doesn't help, pepto bismol somewhat helpful  No weight loss, no abdominal pain  Tried omeprazole for acid reflux symptoms, this did not help the nausea  At last visit given MiraLAX for constipation, constipation resolved  Few years ago was diagnosed with an ulcer    Quit smoking 3 weeks ago, he did not do the Chantix, quit cold turkey    Wt Readings from Last 3 Encounters:   11/07/18 210 lb 3.2 oz (95.3 kg)   09/19/18 201 lb (91.2 kg)   02/05/18 220 lb (99.8 kg)       Patient Active Problem List   Diagnosis    Smokes    Mild intermittent asthma without complication    Anxiety and depression    Gastroesophageal reflux disease without esophagitis    Constipation    Genital herpes simplex       Past Medical History:   Diagnosis Date    Asthma      Past Surgical History:   Procedure Laterality Date    ELBOW SURGERY      EYE SURGERY       Family History   Problem Relation Age of Onset    Diabetes Mother     Other Mother     Asthma Father     Other Father      Social History   Substance Use Topics    Smoking status: Current Every Day

## 2019-04-01 ENCOUNTER — TELEPHONE (OUTPATIENT)
Dept: GASTROENTEROLOGY | Age: 29
End: 2019-04-01

## 2020-01-28 ENCOUNTER — NURSE TRIAGE (OUTPATIENT)
Dept: OTHER | Facility: CLINIC | Age: 30
End: 2020-01-28

## 2020-03-27 ENCOUNTER — NURSE TRIAGE (OUTPATIENT)
Dept: OTHER | Facility: CLINIC | Age: 30
End: 2020-03-27

## 2020-09-06 ENCOUNTER — HOSPITAL ENCOUNTER (EMERGENCY)
Age: 30
Discharge: HOME | End: 2020-09-06
Attending: STUDENT IN AN ORGANIZED HEALTH CARE EDUCATION/TRAINING PROGRAM
Payer: COMMERCIAL

## 2020-09-06 VITALS
WEIGHT: 219 LBS | SYSTOLIC BLOOD PRESSURE: 146 MMHG | TEMPERATURE: 97.2 F | HEIGHT: 70 IN | HEART RATE: 54 BPM | DIASTOLIC BLOOD PRESSURE: 89 MMHG | RESPIRATION RATE: 16 BRPM | OXYGEN SATURATION: 99 % | BODY MASS INDEX: 31.35 KG/M2

## 2020-09-06 PROCEDURE — 96372 THER/PROPH/DIAG INJ SC/IM: CPT

## 2020-09-06 PROCEDURE — 87491 CHLMYD TRACH DNA AMP PROBE: CPT

## 2020-09-06 PROCEDURE — 99283 EMERGENCY DEPT VISIT LOW MDM: CPT

## 2020-09-06 PROCEDURE — 6370000000 HC RX 637 (ALT 250 FOR IP): Performed by: PHYSICIAN ASSISTANT

## 2020-09-06 PROCEDURE — 6360000002 HC RX W HCPCS: Performed by: PHYSICIAN ASSISTANT

## 2020-09-06 PROCEDURE — 87591 N.GONORRHOEAE DNA AMP PROB: CPT

## 2020-09-06 RX ORDER — ONDANSETRON 4 MG/1
4 TABLET, ORALLY DISINTEGRATING ORAL ONCE
Status: COMPLETED | OUTPATIENT
Start: 2020-09-06 | End: 2020-09-06

## 2020-09-06 RX ORDER — CEFTRIAXONE SODIUM 250 MG/1
250 INJECTION, POWDER, FOR SOLUTION INTRAMUSCULAR; INTRAVENOUS ONCE
Status: COMPLETED | OUTPATIENT
Start: 2020-09-06 | End: 2020-09-06

## 2020-09-06 RX ORDER — AZITHROMYCIN 250 MG/1
1000 TABLET, FILM COATED ORAL ONCE
Status: COMPLETED | OUTPATIENT
Start: 2020-09-06 | End: 2020-09-06

## 2020-09-06 RX ADMIN — AZITHROMYCIN MONOHYDRATE 1000 MG: 250 TABLET ORAL at 19:10

## 2020-09-06 RX ADMIN — CEFTRIAXONE SODIUM 250 MG: 250 INJECTION, POWDER, FOR SOLUTION INTRAMUSCULAR; INTRAVENOUS at 19:13

## 2020-09-06 RX ADMIN — ONDANSETRON 4 MG: 4 TABLET, ORALLY DISINTEGRATING ORAL at 19:09

## 2020-09-06 ASSESSMENT — PAIN SCALES - GENERAL: PAINLEVEL_OUTOF10: 10

## 2020-09-06 ASSESSMENT — PAIN DESCRIPTION - LOCATION: LOCATION: GROIN

## 2020-09-06 ASSESSMENT — PAIN DESCRIPTION - PAIN TYPE: TYPE: ACUTE PAIN

## 2020-09-06 NOTE — ED PROVIDER NOTES
[address] ED  eMERGENCY dEPARTMENT eNCOUnter      Pt Name: Sanjuana Dorsey  MRN: [id number]  [place] 1990  Date of evaluation: 9/6/2020  Provider: MARCIO Richards    CHIEF COMPLAINT       Chief Complaint   Patient presents with    Exposure to STD           HISTORY OF PRESENT ILLNESS  (Location/Symptom, Timing/Onset, Context/Setting, Quality, Duration, Modifying Factors, Severity.)   Sanjuana Dorsey is a 31 y.o. male who presents to the emergency department c/o penile discharge and dysuria that started getting worse today. States that he has had an STD in the past and this feels similar to previous. He denies any rashes or lesions. He states that he just wants to be treated. .       Nursing Notes were reviewed. REVIEW OF SYSTEMS    (2-9 systems for level 4, 10 or more for level 5)     Review of Systems   Constitutional: Negative. HENT: Negative. Eyes: Negative. Respiratory: Negative. Cardiovascular: Negative. Gastrointestinal: Negative. Musculoskeletal: Negative. Endocrine: Negative. Genitourinary: penile discharge  Skin: Negative. Allergic/Immunologic: Negative. Neurological: Negative. Hematological: Negative. Psychiatric/Behavioral: Negative. Except as noted above the remainder of the review of systems was reviewed and negative. PAST MEDICAL HISTORY     Past Medical History:   Diagnosis Date    Asthma      None otherwise stated in nurses notes    SURGICAL HISTORY       Past Surgical History:   Procedure Laterality Date    ELBOW SURGERY      EYE SURGERY       None otherwise stated in nurses notes    CURRENT MEDICATIONS       Previous Medications    ALBUTEROL SULFATE HFA (PROVENTIL HFA) 108 (90 BASE) MCG/ACT INHALER    Inhale 1-2 puffs into the lungs every 4 hours as needed for Wheezing or Shortness of Breath Space out to every 6 hours as symptoms improve.     METOCLOPRAMIDE (REGLAN) 5 MG/5ML SOLUTION    Take 5 mLs by mouth 3 times daily as needed (nausea) completed with a voice recognition program.  Efforts were made to edit the dictations but occasionally words are mis-transcribed.)    Desire Melendez 1163, [place]  09/06/20 6388

## 2020-09-06 NOTE — ED TRIAGE NOTES
Mode of arrival (squad #, walk in, police, etc) : Walk In        Chief complaint(s): Exposure to STD        Arrival Note (brief scenario, treatment PTA, etc). : Pt arrives to ED c/o dysuria and penile discharge. Patient states that he thinks he has an STD. Patient states that he noticed his symptoms over the last couple of days. C= \"Have you ever felt that you should Cut down on your drinking? \"  No  A= \"Have people Annoyed you by criticizing your drinking? \"  No  G= \"Have you ever felt bad or Guilty about your drinking? \"  No  E= \"Have you ever had a drink as an Eye-opener first thing in the morning to steady your nerves or to help a hangover? \"  No      Deferred []      Reason for deferring: N/A    *If yes to two or more: probable alcohol abuse. *

## 2020-09-07 LAB
C. TRACHOMATIS DNA ,URINE: ABNORMAL
N. GONORRHOEAE DNA, URINE: ABNORMAL
SPECIMEN DESCRIPTION: ABNORMAL

## 2020-10-06 ENCOUNTER — OFFICE VISIT (OUTPATIENT)
Dept: PODIATRY | Age: 30
End: 2020-10-06
Payer: COMMERCIAL

## 2020-10-06 VITALS — WEIGHT: 225 LBS | HEIGHT: 71 IN | BODY MASS INDEX: 31.5 KG/M2

## 2020-10-06 PROCEDURE — 99203 OFFICE O/P NEW LOW 30 MIN: CPT | Performed by: PODIATRIST

## 2020-10-06 PROCEDURE — G8427 DOCREV CUR MEDS BY ELIG CLIN: HCPCS | Performed by: PODIATRIST

## 2020-10-06 PROCEDURE — L4360 PNEUMAT WALKING BOOT PRE CST: HCPCS | Performed by: PODIATRIST

## 2020-10-06 PROCEDURE — 1036F TOBACCO NON-USER: CPT | Performed by: PODIATRIST

## 2020-10-06 PROCEDURE — G8484 FLU IMMUNIZE NO ADMIN: HCPCS | Performed by: PODIATRIST

## 2020-10-06 PROCEDURE — G8417 CALC BMI ABV UP PARAM F/U: HCPCS | Performed by: PODIATRIST

## 2020-10-06 RX ORDER — IBUPROFEN 800 MG/1
800 TABLET ORAL EVERY 8 HOURS PRN
Qty: 90 TABLET | Refills: 3 | Status: SHIPPED | OUTPATIENT
Start: 2020-10-06

## 2020-10-06 RX ORDER — CEPHALEXIN 500 MG/1
500 CAPSULE ORAL 4 TIMES DAILY
COMMUNITY

## 2020-10-06 ASSESSMENT — ENCOUNTER SYMPTOMS
NAUSEA: 0
COLOR CHANGE: 0
BACK PAIN: 0
DIARRHEA: 0
SHORTNESS OF BREATH: 0
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
## 2021-01-14 ENCOUNTER — APPOINTMENT (OUTPATIENT)
Dept: CT IMAGING | Age: 31
End: 2021-01-14
Payer: COMMERCIAL

## 2021-01-14 ENCOUNTER — HOSPITAL ENCOUNTER (EMERGENCY)
Age: 31
Discharge: HOME | End: 2021-01-15
Attending: EMERGENCY MEDICINE
Payer: COMMERCIAL

## 2021-01-14 VITALS
HEART RATE: 81 BPM | DIASTOLIC BLOOD PRESSURE: 96 MMHG | TEMPERATURE: 98.5 F | RESPIRATION RATE: 27 BRPM | SYSTOLIC BLOOD PRESSURE: 139 MMHG | HEIGHT: 71 IN | OXYGEN SATURATION: 96 % | WEIGHT: 215 LBS | BODY MASS INDEX: 30.1 KG/M2

## 2021-01-14 DIAGNOSIS — S39.012A STRAIN OF LUMBAR REGION, INITIAL ENCOUNTER: ICD-10-CM
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
## 2021-01-14 LAB
ALLEN TEST: ABNORMAL
ANION GAP: 13 MMOL/L (ref 7–16)
FIO2: ABNORMAL
GFR NON-AFRICAN AMERICAN: >60 ML/MIN
GFR SERPL CREATININE-BSD FRML MDRD: >60 ML/MIN
GFR SERPL CREATININE-BSD FRML MDRD: NORMAL ML/MIN/{1.73_M2}
GLUCOSE BLD-MCNC: 96 MG/DL (ref 74–100)
HCO3 VENOUS: 21.6 MMOL/L (ref 22–29)
MODE: ABNORMAL
NEGATIVE BASE EXCESS, VEN: 3 (ref 0–2)
O2 DEVICE/FLOW/%: ABNORMAL
O2 SAT, VEN: 87 % (ref 60–85)
PATIENT TEMP: ABNORMAL
PCO2, VEN: 36.5 MM HG (ref 41–51)
PH VENOUS: 7.38 (ref 7.32–7.43)
PO2, VEN: 53.2 MM HG (ref 30–50)
POC CHLORIDE: 109 MMOL/L (ref 98–107)
POC CREATININE: 0.89 MG/DL (ref 0.51–1.19)
POC HEMATOCRIT: 46 % (ref 41–53)
POC HEMOGLOBIN: 15.7 G/DL (ref 13.5–17.5)
POC IONIZED CALCIUM: 1.12 MMOL/L (ref 1.15–1.33)
POC LACTIC ACID: 3.52 MMOL/L (ref 0.56–1.39)
POC PCO2 TEMP: ABNORMAL MM HG
POC PH TEMP: ABNORMAL
POC PO2 TEMP: ABNORMAL MM HG
POC POTASSIUM: 3.1 MMOL/L (ref 3.5–4.5)
POC SODIUM: 144 MMOL/L (ref 138–146)
POSITIVE BASE EXCESS, VEN: ABNORMAL (ref 0–3)
SAMPLE SITE: ABNORMAL
TOTAL CO2, VENOUS: 23 MMOL/L (ref 23–30)

## 2021-01-14 PROCEDURE — 85014 HEMATOCRIT: CPT

## 2021-01-14 PROCEDURE — 82435 ASSAY OF BLOOD CHLORIDE: CPT

## 2021-01-14 PROCEDURE — 96375 TX/PRO/DX INJ NEW DRUG ADDON: CPT

## 2021-01-14 PROCEDURE — 83605 ASSAY OF LACTIC ACID: CPT

## 2021-01-14 PROCEDURE — 82947 ASSAY GLUCOSE BLOOD QUANT: CPT

## 2021-01-14 PROCEDURE — 82803 BLOOD GASES ANY COMBINATION: CPT

## 2021-01-14 PROCEDURE — 85730 THROMBOPLASTIN TIME PARTIAL: CPT

## 2021-01-14 PROCEDURE — 71260 CT THORAX DX C+: CPT

## 2021-01-14 PROCEDURE — 3209999900 CT THORACIC SPINE TRAUMA RECONSTRUCTION

## 2021-01-14 PROCEDURE — 82565 ASSAY OF CREATININE: CPT

## 2021-01-14 PROCEDURE — 80051 ELECTROLYTE PANEL: CPT

## 2021-01-14 PROCEDURE — 84520 ASSAY OF UREA NITROGEN: CPT

## 2021-01-14 PROCEDURE — 86901 BLOOD TYPING SEROLOGIC RH(D): CPT

## 2021-01-14 PROCEDURE — 3209999900 CT LUMBAR SPINE TRAUMA RECONSTRUCTION

## 2021-01-14 PROCEDURE — 86900 BLOOD TYPING SEROLOGIC ABO: CPT

## 2021-01-14 PROCEDURE — 85610 PROTHROMBIN TIME: CPT

## 2021-01-14 PROCEDURE — 85027 COMPLETE CBC AUTOMATED: CPT

## 2021-01-14 PROCEDURE — 84703 CHORIONIC GONADOTROPIN ASSAY: CPT

## 2021-01-14 PROCEDURE — 82330 ASSAY OF CALCIUM: CPT

## 2021-01-14 PROCEDURE — 70450 CT HEAD/BRAIN W/O DYE: CPT

## 2021-01-14 PROCEDURE — 72125 CT NECK SPINE W/O DYE: CPT

## 2021-01-14 PROCEDURE — 96374 THER/PROPH/DIAG INJ IV PUSH: CPT

## 2021-01-14 PROCEDURE — 84132 ASSAY OF SERUM POTASSIUM: CPT

## 2021-01-14 PROCEDURE — 86850 RBC ANTIBODY SCREEN: CPT

## 2021-01-14 PROCEDURE — G0480 DRUG TEST DEF 1-7 CLASSES: HCPCS

## 2021-01-14 PROCEDURE — 99284 EMERGENCY DEPT VISIT MOD MDM: CPT

## 2021-01-14 PROCEDURE — 84295 ASSAY OF SERUM SODIUM: CPT

## 2021-01-14 PROCEDURE — 82805 BLOOD GASES W/O2 SATURATION: CPT

## 2021-01-14 PROCEDURE — 6360000004 HC RX CONTRAST MEDICATION: Performed by: STUDENT IN AN ORGANIZED HEALTH CARE EDUCATION/TRAINING PROGRAM

## 2021-01-14 RX ORDER — FENTANYL CITRATE 50 UG/ML
50 INJECTION, SOLUTION INTRAMUSCULAR; INTRAVENOUS ONCE
Status: COMPLETED | OUTPATIENT
Start: 2021-01-15 | End: 2021-01-15

## 2021-01-14 RX ADMIN — IOPAMIDOL 75 ML: 755 INJECTION, SOLUTION INTRAVENOUS at 23:58

## 2021-01-15 LAB
ABO/RH: NORMAL
ALLEN TEST: ABNORMAL
ANION GAP SERPL CALCULATED.3IONS-SCNC: 13 MMOL/L (ref 9–17)
ANTIBODY SCREEN: NEGATIVE
ARM BAND NUMBER: NORMAL
BLOOD BANK SPECIMEN: ABNORMAL
BUN BLDV-MCNC: 11 MG/DL (ref 6–20)
CARBOXYHEMOGLOBIN: 11.1 % (ref 0–5)
CHLORIDE BLD-SCNC: 107 MMOL/L (ref 98–107)
CO2: 20 MMOL/L (ref 20–31)
CREAT SERPL-MCNC: 0.95 MG/DL (ref 0.7–1.2)
ETHANOL PERCENT: 0.05 %
ETHANOL: 46 MG/DL
EXPIRATION DATE: NORMAL
FIO2: ABNORMAL
GFR AFRICAN AMERICAN: >60 ML/MIN
GFR NON-AFRICAN AMERICAN: >60 ML/MIN
GFR SERPL CREATININE-BSD FRML MDRD: ABNORMAL ML/MIN/{1.73_M2}
GFR SERPL CREATININE-BSD FRML MDRD: ABNORMAL ML/MIN/{1.73_M2}
GLUCOSE BLD-MCNC: 96 MG/DL (ref 70–99)
HCG QUALITATIVE: ABNORMAL
HCO3 VENOUS: 19.3 MMOL/L (ref 24–30)
HCT VFR BLD CALC: 45 % (ref 40.7–50.3)
HEMOGLOBIN: 14.9 G/DL (ref 13–17)
INR BLD: 0.9
MCH RBC QN AUTO: 30.1 PG (ref 25.2–33.5)
MCHC RBC AUTO-ENTMCNC: 33.1 G/DL (ref 28.4–34.8)
MCV RBC AUTO: 90.9 FL (ref 82.6–102.9)
METHEMOGLOBIN: ABNORMAL % (ref 0–1.5)
MODE: ABNORMAL
NEGATIVE BASE EXCESS, VEN: 4.3 MMOL/L (ref 0–2)
NOTIFICATION TIME: ABNORMAL
NOTIFICATION: ABNORMAL
NRBC AUTOMATED: 0 PER 100 WBC
O2 DEVICE/FLOW/%: ABNORMAL
O2 SAT, VEN: 99 % (ref 60–85)
OXYHEMOGLOBIN: ABNORMAL % (ref 95–98)
PARTIAL THROMBOPLASTIN TIME: 25.2 SEC (ref 20.5–30.5)
PATIENT TEMP: 37
PCO2, VEN, TEMP ADJ: ABNORMAL MMHG (ref 39–55)
PCO2, VEN: 33.3 (ref 39–55)
PDW BLD-RTO: 13.6 % (ref 11.8–14.4)
PEEP/CPAP: ABNORMAL
PH VENOUS: 7.38 (ref 7.32–7.42)
PH, VEN, TEMP ADJ: ABNORMAL (ref 7.32–7.42)
PLATELET # BLD: 203 K/UL (ref 138–453)
PMV BLD AUTO: 12.1 FL (ref 8.1–13.5)
PO2, VEN, TEMP ADJ: ABNORMAL MMHG (ref 30–50)
PO2, VEN: 167 (ref 30–50)
POC CREATININE WHOLE BLOOD: 0.89
POSITIVE BASE EXCESS, VEN: ABNORMAL MMOL/L (ref 0–2)
POTASSIUM SERPL-SCNC: 3.3 MMOL/L (ref 3.7–5.3)
PROTHROMBIN TIME: 9.9 SEC (ref 9–12)
PSV: ABNORMAL
PT. POSITION: ABNORMAL
RBC # BLD: 4.95 M/UL (ref 4.21–5.77)
RESPIRATORY RATE: ABNORMAL
SAMPLE SITE: ABNORMAL
SET RATE: ABNORMAL
SODIUM BLD-SCNC: 140 MMOL/L (ref 135–144)
TEXT FOR RESPIRATORY: ABNORMAL
TOTAL HB: ABNORMAL G/DL (ref 12–16)
TOTAL RATE: ABNORMAL
VT: ABNORMAL
WBC # BLD: 8.9 K/UL (ref 3.5–11.3)

## 2021-01-15 PROCEDURE — 6360000002 HC RX W HCPCS: Performed by: STUDENT IN AN ORGANIZED HEALTH CARE EDUCATION/TRAINING PROGRAM

## 2021-01-15 PROCEDURE — 82330 ASSAY OF CALCIUM: CPT

## 2021-01-15 PROCEDURE — 96375 TX/PRO/DX INJ NEW DRUG ADDON: CPT

## 2021-01-15 RX ORDER — ORPHENADRINE CITRATE 30 MG/ML
30 INJECTION INTRAMUSCULAR; INTRAVENOUS ONCE
Status: COMPLETED | OUTPATIENT
Start: 2021-01-15 | End: 2021-01-15

## 2021-01-15 RX ORDER — KETOROLAC TROMETHAMINE 15 MG/ML
15 INJECTION, SOLUTION INTRAMUSCULAR; INTRAVENOUS ONCE
Status: COMPLETED | OUTPATIENT
Start: 2021-01-15 | End: 2021-01-15

## 2021-01-15 RX ORDER — CYCLOBENZAPRINE HCL 10 MG
10 TABLET ORAL 3 TIMES DAILY PRN
Qty: 15 TABLET | Refills: 0 | Status: SHIPPED | OUTPATIENT
Start: 2021-01-15 | End: 2021-01-20

## 2021-01-15 RX ORDER — LIDOCAINE 4 G/G
1 PATCH TOPICAL DAILY
Status: DISCONTINUED | OUTPATIENT
Start: 2021-01-15 | End: 2021-01-15 | Stop reason: HOSPADM

## 2021-01-15 RX ADMIN — ORPHENADRINE CITRATE 30 MG: 60 INJECTION INTRAMUSCULAR; INTRAVENOUS at 00:45

## 2021-01-15 RX ADMIN — FENTANYL CITRATE 50 MCG: 50 INJECTION, SOLUTION INTRAMUSCULAR; INTRAVENOUS at 00:07

## 2021-01-15 RX ADMIN — KETOROLAC TROMETHAMINE 15 MG: 15 INJECTION, SOLUTION INTRAMUSCULAR; INTRAVENOUS at 00:45

## 2021-01-15 ASSESSMENT — ENCOUNTER SYMPTOMS
VOMITING: 0
NAUSEA: 0
ABDOMINAL PAIN: 0
SHORTNESS OF BREATH: 0
BACK PAIN: 1

## 2021-01-15 ASSESSMENT — PAIN SCALES - GENERAL: PAINLEVEL_OUTOF10: 10

## 2021-01-15 NOTE — ED NOTES
Bed: 15  Expected date: 1/14/21  Expected time: 11:20 PM  Means of arrival:   Comments:  Brandie Duff X 600 Luis Zamudio RN  01/14/21 5621
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
## 2021-01-24 ENCOUNTER — NURSE TRIAGE (OUTPATIENT)
Dept: OTHER | Facility: CLINIC | Age: 31
End: 2021-01-24
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
## 2021-11-11 ENCOUNTER — NURSE TRIAGE (OUTPATIENT)
Dept: OTHER | Facility: CLINIC | Age: 31
End: 2021-11-11

## 2021-11-12 NOTE — TELEPHONE ENCOUNTER
Reason for Disposition   SEVERE abdomen pain (e.g., excruciating)    Answer Assessment - Initial Assessment Questions  1. DATE/TIME: \"When did you have your colonosocpy? \"       x3 days ago    2. MAIN CONCERN: Rose Rasheed is your main concern right now? \" \"What questions do you have? \"      Blood/blood clots in stool, rectal pain    3. ABDOMEN PAIN: Lea Estrada you having any abdomen (belly or stomach) pain? \" If Yes, \"How bad is it? \" (e.g., Scale 1-10; mild, moderate, severe). - MILD (1-3): doesn't interfere with normal activities, abdomen soft and not tender to touch      - MODERATE (4-7): interferes with normal activities or awakens from sleep, tender to touch      - SEVERE (8-10): excruciating pain, doubled over, unable to do any normal activities        Yes- 9/10    4. OTHER SYMPTOMS: \"What other symptoms are you having? \" (e.g., rectal bleeding, bloating or feeling gassy, passing gas, vomiting, dizziness, fever). Rectal bleeding, rectal pain (10/10)    5. ONSET: \"When did your symptoms start? \"      Started x1 day after colonoscopy    6. PATTERN: \"Is the symptom(s) constant or does it come and go? \" \"Is your symptom(s) getting worse, better, or staying the same? \"      Come and go. Getting worse. Protocols used: COLONOSCOPY SYMPTOMS AND QUESTIONS-ADULT-    Brief description of triage: See above note    Triage indicates for patient to: See disposition    Care advice provided, patient verbalizes understanding; denies any other questions or concerns; instructed to call back for any new or worsening symptoms. This triage is a result of a call to [address] [place] [address]. Please do not respond to the triage nurse through this encounter. Any subsequent communication should be directly with the patient.